# Patient Record
Sex: MALE | Race: BLACK OR AFRICAN AMERICAN | NOT HISPANIC OR LATINO | Employment: OTHER | URBAN - METROPOLITAN AREA
[De-identification: names, ages, dates, MRNs, and addresses within clinical notes are randomized per-mention and may not be internally consistent; named-entity substitution may affect disease eponyms.]

---

## 2017-01-28 ENCOUNTER — APPOINTMENT (OUTPATIENT)
Dept: CT IMAGING | Facility: HOSPITAL | Age: 77
End: 2017-01-28

## 2017-01-28 ENCOUNTER — APPOINTMENT (OUTPATIENT)
Dept: GENERAL RADIOLOGY | Facility: HOSPITAL | Age: 77
End: 2017-01-28

## 2017-01-28 ENCOUNTER — HOSPITAL ENCOUNTER (INPATIENT)
Facility: HOSPITAL | Age: 77
LOS: 5 days | Discharge: HOME | End: 2017-02-02
Attending: EMERGENCY MEDICINE | Admitting: FAMILY MEDICINE

## 2017-01-28 DIAGNOSIS — Z74.09 IMPAIRED FUNCTIONAL MOBILITY, BALANCE, GAIT, AND ENDURANCE: ICD-10-CM

## 2017-01-28 DIAGNOSIS — D64.89 ANEMIA DUE TO OTHER CAUSE: ICD-10-CM

## 2017-01-28 DIAGNOSIS — N28.9 RENAL INSUFFICIENCY: Primary | ICD-10-CM

## 2017-01-28 PROBLEM — E11.9 T2DM (TYPE 2 DIABETES MELLITUS) (HCC): Status: ACTIVE | Noted: 2017-01-28

## 2017-01-28 PROBLEM — R62.51 FAILURE TO THRIVE (0-17): Status: ACTIVE | Noted: 2017-01-28

## 2017-01-28 PROBLEM — E87.5 HYPERKALEMIA: Status: ACTIVE | Noted: 2017-01-28

## 2017-01-28 PROBLEM — I10 ESSENTIAL HYPERTENSION: Status: ACTIVE | Noted: 2017-01-28

## 2017-01-28 PROBLEM — N17.9 ACUTE KIDNEY INJURY (HCC): Status: ACTIVE | Noted: 2017-01-28

## 2017-01-28 PROBLEM — R11.2 NAUSEA & VOMITING: Status: ACTIVE | Noted: 2017-01-28

## 2017-01-28 PROBLEM — D72.829 LEUKOCYTOSIS: Status: ACTIVE | Noted: 2017-01-28

## 2017-01-28 PROBLEM — D75.839 THROMBOCYTOSIS: Status: ACTIVE | Noted: 2017-01-28

## 2017-01-28 PROBLEM — D64.9 ANEMIA: Status: ACTIVE | Noted: 2017-01-28

## 2017-01-28 LAB
ALBUMIN SERPL-MCNC: 3.8 G/DL (ref 3.2–4.8)
ALBUMIN/GLOB SERPL: 1 G/DL (ref 1.5–2.5)
ALP SERPL-CCNC: 88 U/L (ref 25–100)
ALT SERPL W P-5'-P-CCNC: 19 U/L (ref 7–40)
ANION GAP SERPL CALCULATED.3IONS-SCNC: 8 MMOL/L (ref 3–11)
AST SERPL-CCNC: 19 U/L (ref 0–33)
BASOPHILS # BLD AUTO: 0.01 10*3/MM3 (ref 0–0.2)
BASOPHILS NFR BLD AUTO: 0.1 % (ref 0–1)
BILIRUB SERPL-MCNC: 0.2 MG/DL (ref 0.3–1.2)
BILIRUB UR QL STRIP: NEGATIVE
BUN BLD-MCNC: 49 MG/DL (ref 9–23)
BUN/CREAT SERPL: 20.4 (ref 7–25)
CALCIUM SPEC-SCNC: 11 MG/DL (ref 8.7–10.4)
CHLORIDE SERPL-SCNC: 99 MMOL/L (ref 99–109)
CLARITY UR: CLEAR
CO2 SERPL-SCNC: 26 MMOL/L (ref 20–31)
COLOR UR: YELLOW
CREAT BLD-MCNC: 2.4 MG/DL (ref 0.6–1.3)
DEPRECATED RDW RBC AUTO: 39.9 FL (ref 37–54)
EOSINOPHIL # BLD AUTO: 0.03 10*3/MM3 (ref 0.1–0.3)
EOSINOPHIL NFR BLD AUTO: 0.3 % (ref 0–3)
ERYTHROCYTE [DISTWIDTH] IN BLOOD BY AUTOMATED COUNT: 13 % (ref 11.3–14.5)
GFR SERPL CREATININE-BSD FRML MDRD: 32 ML/MIN/1.73
GLOBULIN UR ELPH-MCNC: 4 GM/DL
GLUCOSE BLD-MCNC: 272 MG/DL (ref 70–100)
GLUCOSE BLDC GLUCOMTR-MCNC: 131 MG/DL (ref 70–130)
GLUCOSE BLDC GLUCOMTR-MCNC: 211 MG/DL (ref 70–130)
GLUCOSE UR STRIP-MCNC: ABNORMAL MG/DL
HCT VFR BLD AUTO: 29.4 % (ref 38.9–50.9)
HGB BLD-MCNC: 9.8 G/DL (ref 13.1–17.5)
HGB UR QL STRIP.AUTO: NEGATIVE
HOLD SPECIMEN: NORMAL
HOLD SPECIMEN: NORMAL
IMM GRANULOCYTES # BLD: 0.07 10*3/MM3 (ref 0–0.03)
IMM GRANULOCYTES NFR BLD: 0.6 % (ref 0–0.6)
KETONES UR QL STRIP: NEGATIVE
LEUKOCYTE ESTERASE UR QL STRIP.AUTO: NEGATIVE
LIPASE SERPL-CCNC: 77 U/L (ref 6–51)
LYMPHOCYTES # BLD AUTO: 0.82 10*3/MM3 (ref 0.6–4.8)
LYMPHOCYTES NFR BLD AUTO: 7.6 % (ref 24–44)
MCH RBC QN AUTO: 28.3 PG (ref 27–31)
MCHC RBC AUTO-ENTMCNC: 33.3 G/DL (ref 32–36)
MCV RBC AUTO: 85 FL (ref 80–99)
MONOCYTES # BLD AUTO: 0.94 10*3/MM3 (ref 0–1)
MONOCYTES NFR BLD AUTO: 8.7 % (ref 0–12)
NEUTROPHILS # BLD AUTO: 8.95 10*3/MM3 (ref 1.5–8.3)
NEUTROPHILS NFR BLD AUTO: 82.7 % (ref 41–71)
NITRITE UR QL STRIP: NEGATIVE
PH UR STRIP.AUTO: <=5 [PH] (ref 5–8)
PLATELET # BLD AUTO: 544 10*3/MM3 (ref 150–450)
PMV BLD AUTO: 8.9 FL (ref 6–12)
POTASSIUM BLD-SCNC: 5.8 MMOL/L (ref 3.5–5.5)
PROT SERPL-MCNC: 7.8 G/DL (ref 5.7–8.2)
PROT UR QL STRIP: NEGATIVE
RBC # BLD AUTO: 3.46 10*6/MM3 (ref 4.2–5.76)
SODIUM BLD-SCNC: 133 MMOL/L (ref 132–146)
SP GR UR STRIP: 1.02 (ref 1–1.03)
TROPONIN I SERPL-MCNC: 0 NG/ML (ref 0–0.07)
UROBILINOGEN UR QL STRIP: ABNORMAL
WBC NRBC COR # BLD: 10.82 10*3/MM3 (ref 3.5–10.8)
WHOLE BLOOD HOLD SPECIMEN: NORMAL
WHOLE BLOOD HOLD SPECIMEN: NORMAL

## 2017-01-28 PROCEDURE — 71020 HC CHEST PA AND LATERAL: CPT

## 2017-01-28 PROCEDURE — 36415 COLL VENOUS BLD VENIPUNCTURE: CPT

## 2017-01-28 PROCEDURE — 82962 GLUCOSE BLOOD TEST: CPT

## 2017-01-28 PROCEDURE — 80053 COMPREHEN METABOLIC PANEL: CPT | Performed by: EMERGENCY MEDICINE

## 2017-01-28 PROCEDURE — 25010000002 ONDANSETRON PER 1 MG: Performed by: EMERGENCY MEDICINE

## 2017-01-28 PROCEDURE — 81003 URINALYSIS AUTO W/O SCOPE: CPT | Performed by: EMERGENCY MEDICINE

## 2017-01-28 PROCEDURE — 74176 CT ABD & PELVIS W/O CONTRAST: CPT

## 2017-01-28 PROCEDURE — 99285 EMERGENCY DEPT VISIT HI MDM: CPT

## 2017-01-28 PROCEDURE — 99223 1ST HOSP IP/OBS HIGH 75: CPT | Performed by: FAMILY MEDICINE

## 2017-01-28 PROCEDURE — 83690 ASSAY OF LIPASE: CPT | Performed by: EMERGENCY MEDICINE

## 2017-01-28 PROCEDURE — 93005 ELECTROCARDIOGRAM TRACING: CPT | Performed by: EMERGENCY MEDICINE

## 2017-01-28 PROCEDURE — 85025 COMPLETE CBC W/AUTO DIFF WBC: CPT | Performed by: EMERGENCY MEDICINE

## 2017-01-28 PROCEDURE — 25010000002 HEPARIN (PORCINE) PER 1000 UNITS: Performed by: NURSE PRACTITIONER

## 2017-01-28 PROCEDURE — 84484 ASSAY OF TROPONIN QUANT: CPT

## 2017-01-28 PROCEDURE — 63710000001 INSULIN LISPRO (HUMAN) PER 5 UNITS: Performed by: NURSE PRACTITIONER

## 2017-01-28 RX ORDER — LISINOPRIL 10 MG/1
10 TABLET ORAL DAILY
COMMUNITY
End: 2017-02-02 | Stop reason: HOSPADM

## 2017-01-28 RX ORDER — PANTOPRAZOLE SODIUM 40 MG/10ML
40 INJECTION, POWDER, LYOPHILIZED, FOR SOLUTION INTRAVENOUS
Status: DISCONTINUED | OUTPATIENT
Start: 2017-01-29 | End: 2017-02-02 | Stop reason: HOSPADM

## 2017-01-28 RX ORDER — ONDANSETRON 2 MG/ML
4 INJECTION INTRAMUSCULAR; INTRAVENOUS EVERY 6 HOURS PRN
Status: DISCONTINUED | OUTPATIENT
Start: 2017-01-28 | End: 2017-02-02 | Stop reason: HOSPADM

## 2017-01-28 RX ORDER — ACETAMINOPHEN 325 MG/1
650 TABLET ORAL EVERY 4 HOURS PRN
Status: DISCONTINUED | OUTPATIENT
Start: 2017-01-28 | End: 2017-02-02 | Stop reason: HOSPADM

## 2017-01-28 RX ORDER — HEPARIN SODIUM 5000 [USP'U]/ML
5000 INJECTION, SOLUTION INTRAVENOUS; SUBCUTANEOUS EVERY 8 HOURS SCHEDULED
Status: DISCONTINUED | OUTPATIENT
Start: 2017-01-28 | End: 2017-01-28

## 2017-01-28 RX ORDER — HYDRALAZINE HYDROCHLORIDE 20 MG/ML
20 INJECTION INTRAMUSCULAR; INTRAVENOUS EVERY 6 HOURS PRN
Status: DISCONTINUED | OUTPATIENT
Start: 2017-01-28 | End: 2017-02-02 | Stop reason: HOSPADM

## 2017-01-28 RX ORDER — SODIUM CHLORIDE 9 MG/ML
125 INJECTION, SOLUTION INTRAVENOUS CONTINUOUS
Status: DISCONTINUED | OUTPATIENT
Start: 2017-01-28 | End: 2017-01-30

## 2017-01-28 RX ORDER — DEXTROSE MONOHYDRATE 25 G/50ML
25 INJECTION, SOLUTION INTRAVENOUS
Status: DISCONTINUED | OUTPATIENT
Start: 2017-01-28 | End: 2017-02-02 | Stop reason: HOSPADM

## 2017-01-28 RX ORDER — SODIUM CHLORIDE 0.9 % (FLUSH) 0.9 %
1-10 SYRINGE (ML) INJECTION AS NEEDED
Status: DISCONTINUED | OUTPATIENT
Start: 2017-01-28 | End: 2017-02-02 | Stop reason: HOSPADM

## 2017-01-28 RX ORDER — ONDANSETRON 2 MG/ML
4 INJECTION INTRAMUSCULAR; INTRAVENOUS ONCE
Status: COMPLETED | OUTPATIENT
Start: 2017-01-28 | End: 2017-01-28

## 2017-01-28 RX ORDER — HEPARIN SODIUM 5000 [USP'U]/ML
5000 INJECTION, SOLUTION INTRAVENOUS; SUBCUTANEOUS EVERY 12 HOURS SCHEDULED
Status: DISCONTINUED | OUTPATIENT
Start: 2017-01-28 | End: 2017-02-02 | Stop reason: HOSPADM

## 2017-01-28 RX ORDER — PANTOPRAZOLE SODIUM 40 MG/10ML
40 INJECTION, POWDER, LYOPHILIZED, FOR SOLUTION INTRAVENOUS ONCE
Status: COMPLETED | OUTPATIENT
Start: 2017-01-28 | End: 2017-01-28

## 2017-01-28 RX ORDER — FERROUS SULFATE TAB EC 324 MG (65 MG FE EQUIVALENT) 324 (65 FE) MG
324 TABLET DELAYED RESPONSE ORAL 2 TIMES DAILY WITH MEALS
COMMUNITY

## 2017-01-28 RX ORDER — ONDANSETRON 4 MG/1
4 TABLET, FILM COATED ORAL EVERY 6 HOURS PRN
Status: DISCONTINUED | OUTPATIENT
Start: 2017-01-28 | End: 2017-02-02 | Stop reason: HOSPADM

## 2017-01-28 RX ORDER — NICOTINE POLACRILEX 4 MG
15 LOZENGE BUCCAL
Status: DISCONTINUED | OUTPATIENT
Start: 2017-01-28 | End: 2017-02-02 | Stop reason: HOSPADM

## 2017-01-28 RX ORDER — POLYETHYLENE GLYCOL 3350 17 G/17G
17 POWDER, FOR SOLUTION ORAL DAILY PRN
Status: DISCONTINUED | OUTPATIENT
Start: 2017-01-28 | End: 2017-02-02 | Stop reason: HOSPADM

## 2017-01-28 RX ORDER — SODIUM CHLORIDE 0.9 % (FLUSH) 0.9 %
10 SYRINGE (ML) INJECTION AS NEEDED
Status: DISCONTINUED | OUTPATIENT
Start: 2017-01-28 | End: 2017-02-02 | Stop reason: HOSPADM

## 2017-01-28 RX ORDER — DOCUSATE SODIUM 100 MG/1
100 CAPSULE, LIQUID FILLED ORAL 2 TIMES DAILY
Status: DISCONTINUED | OUTPATIENT
Start: 2017-01-28 | End: 2017-02-02 | Stop reason: HOSPADM

## 2017-01-28 RX ORDER — GLIPIZIDE 5 MG/1
5 TABLET ORAL
COMMUNITY

## 2017-01-28 RX ADMIN — HEPARIN SODIUM 5000 UNITS: 5000 INJECTION, SOLUTION INTRAVENOUS; SUBCUTANEOUS at 21:05

## 2017-01-28 RX ADMIN — ONDANSETRON 4 MG: 2 INJECTION INTRAMUSCULAR; INTRAVENOUS at 12:55

## 2017-01-28 RX ADMIN — INSULIN LISPRO 3 UNITS: 100 INJECTION, SOLUTION INTRAVENOUS; SUBCUTANEOUS at 17:44

## 2017-01-28 RX ADMIN — Medication 10 ML: at 12:48

## 2017-01-28 RX ADMIN — Medication 10 ML: at 15:26

## 2017-01-28 RX ADMIN — SODIUM CHLORIDE 100 ML/HR: 9 INJECTION, SOLUTION INTRAVENOUS at 15:25

## 2017-01-28 RX ADMIN — DOCUSATE SODIUM 100 MG: 100 CAPSULE, LIQUID FILLED ORAL at 17:44

## 2017-01-28 RX ADMIN — PANTOPRAZOLE SODIUM 40 MG: 40 INJECTION, POWDER, FOR SOLUTION INTRAVENOUS at 15:26

## 2017-01-28 RX ADMIN — SODIUM CHLORIDE 1000 ML: 9 INJECTION, SOLUTION INTRAVENOUS at 12:55

## 2017-01-29 LAB
ANION GAP SERPL CALCULATED.3IONS-SCNC: 4 MMOL/L (ref 3–11)
ANION GAP SERPL CALCULATED.3IONS-SCNC: 6 MMOL/L (ref 3–11)
BUN BLD-MCNC: 34 MG/DL (ref 9–23)
BUN BLD-MCNC: 38 MG/DL (ref 9–23)
BUN/CREAT SERPL: 17 (ref 7–25)
BUN/CREAT SERPL: 19 (ref 7–25)
CALCIUM SPEC-SCNC: 10.1 MG/DL (ref 8.7–10.4)
CALCIUM SPEC-SCNC: 9.7 MG/DL (ref 8.7–10.4)
CHLORIDE SERPL-SCNC: 108 MMOL/L (ref 99–109)
CHLORIDE SERPL-SCNC: 108 MMOL/L (ref 99–109)
CO2 SERPL-SCNC: 22 MMOL/L (ref 20–31)
CO2 SERPL-SCNC: 24 MMOL/L (ref 20–31)
CREAT BLD-MCNC: 2 MG/DL (ref 0.6–1.3)
CREAT BLD-MCNC: 2 MG/DL (ref 0.6–1.3)
DEPRECATED RDW RBC AUTO: 41.5 FL (ref 37–54)
ERYTHROCYTE [DISTWIDTH] IN BLOOD BY AUTOMATED COUNT: 13.2 % (ref 11.3–14.5)
GFR SERPL CREATININE-BSD FRML MDRD: 40 ML/MIN/1.73
GFR SERPL CREATININE-BSD FRML MDRD: 40 ML/MIN/1.73
GLUCOSE BLD-MCNC: 225 MG/DL (ref 70–100)
GLUCOSE BLD-MCNC: 374 MG/DL (ref 70–100)
GLUCOSE BLDC GLUCOMTR-MCNC: 196 MG/DL (ref 70–130)
GLUCOSE BLDC GLUCOMTR-MCNC: 246 MG/DL (ref 70–130)
GLUCOSE BLDC GLUCOMTR-MCNC: 286 MG/DL (ref 70–130)
GLUCOSE BLDC GLUCOMTR-MCNC: 303 MG/DL (ref 70–130)
HBA1C MFR BLD: 7.6 % (ref 4.8–5.6)
HCT VFR BLD AUTO: 25.7 % (ref 38.9–50.9)
HGB BLD-MCNC: 8.5 G/DL (ref 13.1–17.5)
MCH RBC QN AUTO: 28.2 PG (ref 27–31)
MCHC RBC AUTO-ENTMCNC: 33.1 G/DL (ref 32–36)
MCV RBC AUTO: 85.4 FL (ref 80–99)
PLATELET # BLD AUTO: 478 10*3/MM3 (ref 150–450)
PMV BLD AUTO: 9 FL (ref 6–12)
POTASSIUM BLD-SCNC: 5.4 MMOL/L (ref 3.5–5.5)
POTASSIUM BLD-SCNC: 5.8 MMOL/L (ref 3.5–5.5)
RBC # BLD AUTO: 3.01 10*6/MM3 (ref 4.2–5.76)
SODIUM BLD-SCNC: 136 MMOL/L (ref 132–146)
SODIUM BLD-SCNC: 136 MMOL/L (ref 132–146)
TSH SERPL DL<=0.05 MIU/L-ACNC: 1.54 MIU/ML (ref 0.35–5.35)
WBC NRBC COR # BLD: 9.19 10*3/MM3 (ref 3.5–10.8)

## 2017-01-29 PROCEDURE — 83036 HEMOGLOBIN GLYCOSYLATED A1C: CPT | Performed by: NURSE PRACTITIONER

## 2017-01-29 PROCEDURE — 97110 THERAPEUTIC EXERCISES: CPT

## 2017-01-29 PROCEDURE — 25010000002 FUROSEMIDE PER 20 MG: Performed by: INTERNAL MEDICINE

## 2017-01-29 PROCEDURE — 84443 ASSAY THYROID STIM HORMONE: CPT | Performed by: NURSE PRACTITIONER

## 2017-01-29 PROCEDURE — 85027 COMPLETE CBC AUTOMATED: CPT | Performed by: NURSE PRACTITIONER

## 2017-01-29 PROCEDURE — 97161 PT EVAL LOW COMPLEX 20 MIN: CPT

## 2017-01-29 PROCEDURE — 80048 BASIC METABOLIC PNL TOTAL CA: CPT | Performed by: NURSE PRACTITIONER

## 2017-01-29 PROCEDURE — 25010000002 HEPARIN (PORCINE) PER 1000 UNITS: Performed by: NURSE PRACTITIONER

## 2017-01-29 PROCEDURE — 82962 GLUCOSE BLOOD TEST: CPT

## 2017-01-29 PROCEDURE — 99233 SBSQ HOSP IP/OBS HIGH 50: CPT | Performed by: INTERNAL MEDICINE

## 2017-01-29 PROCEDURE — 80048 BASIC METABOLIC PNL TOTAL CA: CPT | Performed by: INTERNAL MEDICINE

## 2017-01-29 RX ORDER — FUROSEMIDE 10 MG/ML
40 INJECTION INTRAMUSCULAR; INTRAVENOUS ONCE
Status: COMPLETED | OUTPATIENT
Start: 2017-01-29 | End: 2017-01-29

## 2017-01-29 RX ORDER — FUROSEMIDE 10 MG/ML
40 INJECTION INTRAMUSCULAR; INTRAVENOUS ONCE
Status: DISCONTINUED | OUTPATIENT
Start: 2017-01-29 | End: 2017-01-29

## 2017-01-29 RX ADMIN — INSULIN LISPRO 3 UNITS: 100 INJECTION, SOLUTION INTRAVENOUS; SUBCUTANEOUS at 17:17

## 2017-01-29 RX ADMIN — SODIUM CHLORIDE 100 ML/HR: 9 INJECTION, SOLUTION INTRAVENOUS at 03:53

## 2017-01-29 RX ADMIN — PANTOPRAZOLE SODIUM 40 MG: 40 INJECTION, POWDER, FOR SOLUTION INTRAVENOUS at 05:21

## 2017-01-29 RX ADMIN — POLYETHYLENE GLYCOL 3350 17 G: 17 POWDER, FOR SOLUTION ORAL at 09:01

## 2017-01-29 RX ADMIN — INSULIN LISPRO 2 UNITS: 100 INJECTION, SOLUTION INTRAVENOUS; SUBCUTANEOUS at 09:01

## 2017-01-29 RX ADMIN — HEPARIN SODIUM 5000 UNITS: 5000 INJECTION, SOLUTION INTRAVENOUS; SUBCUTANEOUS at 09:00

## 2017-01-29 RX ADMIN — FUROSEMIDE 40 MG: 10 INJECTION, SOLUTION INTRAMUSCULAR; INTRAVENOUS at 09:00

## 2017-01-29 RX ADMIN — INSULIN LISPRO 5 UNITS: 100 INJECTION, SOLUTION INTRAVENOUS; SUBCUTANEOUS at 21:29

## 2017-01-29 RX ADMIN — HEPARIN SODIUM 5000 UNITS: 5000 INJECTION, SOLUTION INTRAVENOUS; SUBCUTANEOUS at 21:28

## 2017-01-29 RX ADMIN — DOCUSATE SODIUM 100 MG: 100 CAPSULE, LIQUID FILLED ORAL at 09:01

## 2017-01-29 RX ADMIN — INSULIN LISPRO 4 UNITS: 100 INJECTION, SOLUTION INTRAVENOUS; SUBCUTANEOUS at 11:31

## 2017-01-30 ENCOUNTER — APPOINTMENT (OUTPATIENT)
Dept: ULTRASOUND IMAGING | Facility: HOSPITAL | Age: 77
End: 2017-01-30

## 2017-01-30 LAB
CREAT UR-MCNC: 20.4 MG/DL
FERRITIN SERPL-MCNC: 533 NG/ML (ref 22–322)
GLUCOSE BLDC GLUCOMTR-MCNC: 217 MG/DL (ref 70–130)
GLUCOSE BLDC GLUCOMTR-MCNC: 248 MG/DL (ref 70–130)
GLUCOSE BLDC GLUCOMTR-MCNC: 259 MG/DL (ref 70–130)
GLUCOSE BLDC GLUCOMTR-MCNC: 336 MG/DL (ref 70–130)
IRON 24H UR-MRATE: 30 MCG/DL (ref 50–175)
IRON 24H UR-MRATE: 30 MCG/DL (ref 50–175)
IRON SATN MFR SERPL: 16 % (ref 20–50)
MAGNESIUM SERPL-MCNC: 1.3 MG/DL (ref 1.3–2.7)
OSMOLALITY UR: 291 MOSM/KG (ref 300–1100)
TIBC SERPL-MCNC: 186 MCG/DL (ref 250–450)
TROPONIN I SERPL-MCNC: <0.006 NG/ML

## 2017-01-30 PROCEDURE — 99233 SBSQ HOSP IP/OBS HIGH 50: CPT | Performed by: FAMILY MEDICINE

## 2017-01-30 PROCEDURE — 82962 GLUCOSE BLOOD TEST: CPT

## 2017-01-30 PROCEDURE — 93010 ELECTROCARDIOGRAM REPORT: CPT | Performed by: INTERNAL MEDICINE

## 2017-01-30 PROCEDURE — 25010000002 MAGNESIUM SULFATE IN D5W 1G/100ML (PREMIX) 10-5 MG/ML-% SOLUTION: Performed by: FAMILY MEDICINE

## 2017-01-30 PROCEDURE — 25010000002 HEPARIN (PORCINE) PER 1000 UNITS: Performed by: NURSE PRACTITIONER

## 2017-01-30 PROCEDURE — 83735 ASSAY OF MAGNESIUM: CPT | Performed by: FAMILY MEDICINE

## 2017-01-30 PROCEDURE — 83550 IRON BINDING TEST: CPT | Performed by: INTERNAL MEDICINE

## 2017-01-30 PROCEDURE — 82570 ASSAY OF URINE CREATININE: CPT | Performed by: FAMILY MEDICINE

## 2017-01-30 PROCEDURE — 76775 US EXAM ABDO BACK WALL LIM: CPT

## 2017-01-30 PROCEDURE — 93005 ELECTROCARDIOGRAM TRACING: CPT | Performed by: FAMILY MEDICINE

## 2017-01-30 PROCEDURE — 83540 ASSAY OF IRON: CPT | Performed by: INTERNAL MEDICINE

## 2017-01-30 PROCEDURE — 82043 UR ALBUMIN QUANTITATIVE: CPT | Performed by: FAMILY MEDICINE

## 2017-01-30 PROCEDURE — 84484 ASSAY OF TROPONIN QUANT: CPT | Performed by: FAMILY MEDICINE

## 2017-01-30 PROCEDURE — 82728 ASSAY OF FERRITIN: CPT | Performed by: INTERNAL MEDICINE

## 2017-01-30 PROCEDURE — 83935 ASSAY OF URINE OSMOLALITY: CPT | Performed by: FAMILY MEDICINE

## 2017-01-30 RX ORDER — TAMSULOSIN HYDROCHLORIDE 0.4 MG/1
0.4 CAPSULE ORAL DAILY
Status: DISCONTINUED | OUTPATIENT
Start: 2017-01-30 | End: 2017-02-02 | Stop reason: HOSPADM

## 2017-01-30 RX ADMIN — PANTOPRAZOLE SODIUM 40 MG: 40 INJECTION, POWDER, FOR SOLUTION INTRAVENOUS at 06:13

## 2017-01-30 RX ADMIN — INSULIN LISPRO 4 UNITS: 100 INJECTION, SOLUTION INTRAVENOUS; SUBCUTANEOUS at 16:55

## 2017-01-30 RX ADMIN — MAGNESIUM SULFATE HEPTAHYDRATE 1 G: 1 INJECTION, SOLUTION INTRAVENOUS at 18:10

## 2017-01-30 RX ADMIN — INSULIN LISPRO 5 UNITS: 100 INJECTION, SOLUTION INTRAVENOUS; SUBCUTANEOUS at 20:56

## 2017-01-30 RX ADMIN — SODIUM CHLORIDE 125 ML/HR: 9 INJECTION, SOLUTION INTRAVENOUS at 15:10

## 2017-01-30 RX ADMIN — MAGNESIUM SULFATE HEPTAHYDRATE 1 G: 1 INJECTION, SOLUTION INTRAVENOUS at 19:31

## 2017-01-30 RX ADMIN — MAGNESIUM SULFATE HEPTAHYDRATE 1 G: 1 INJECTION, SOLUTION INTRAVENOUS at 20:56

## 2017-01-30 RX ADMIN — SODIUM CHLORIDE 125 ML/HR: 9 INJECTION, SOLUTION INTRAVENOUS at 05:35

## 2017-01-30 RX ADMIN — HEPARIN SODIUM 5000 UNITS: 5000 INJECTION, SOLUTION INTRAVENOUS; SUBCUTANEOUS at 20:56

## 2017-01-30 RX ADMIN — TAMSULOSIN HYDROCHLORIDE 0.4 MG: 0.4 CAPSULE ORAL at 17:55

## 2017-01-30 RX ADMIN — HEPARIN SODIUM 5000 UNITS: 5000 INJECTION, SOLUTION INTRAVENOUS; SUBCUTANEOUS at 08:23

## 2017-01-30 RX ADMIN — INSULIN LISPRO 3 UNITS: 100 INJECTION, SOLUTION INTRAVENOUS; SUBCUTANEOUS at 12:39

## 2017-01-30 RX ADMIN — INSULIN LISPRO 3 UNITS: 100 INJECTION, SOLUTION INTRAVENOUS; SUBCUTANEOUS at 08:14

## 2017-01-31 ENCOUNTER — APPOINTMENT (OUTPATIENT)
Dept: GENERAL RADIOLOGY | Facility: HOSPITAL | Age: 77
End: 2017-01-31
Attending: FAMILY MEDICINE

## 2017-01-31 ENCOUNTER — APPOINTMENT (OUTPATIENT)
Dept: CARDIOLOGY | Facility: HOSPITAL | Age: 77
End: 2017-01-31

## 2017-01-31 LAB
ALBUMIN SERPL-MCNC: 3 G/DL (ref 3.2–4.8)
ALBUMIN/GLOB SERPL: 0.9 G/DL (ref 1.5–2.5)
ALP SERPL-CCNC: 72 U/L (ref 25–100)
ALT SERPL W P-5'-P-CCNC: 31 U/L (ref 7–40)
ANION GAP SERPL CALCULATED.3IONS-SCNC: 5 MMOL/L (ref 3–11)
ARTICHOKE IGE QN: 59 MG/DL (ref 0–130)
AST SERPL-CCNC: 31 U/L (ref 0–33)
BASOPHILS # BLD AUTO: 0.02 10*3/MM3 (ref 0–0.2)
BASOPHILS NFR BLD AUTO: 0.3 % (ref 0–1)
BH CV ECHO MEAS - AO ROOT AREA (BSA CORRECTED): 1.8
BH CV ECHO MEAS - AO ROOT AREA: 4.4 CM^2
BH CV ECHO MEAS - AO ROOT DIAM: 2.4 CM
BH CV ECHO MEAS - BSA(HAYCOCK): 1.3 M^2
BH CV ECHO MEAS - BSA: 1.3 M^2
BH CV ECHO MEAS - BZI_BMI: 16.1 KILOGRAMS/M^2
BH CV ECHO MEAS - BZI_METRIC_HEIGHT: 157.5 CM
BH CV ECHO MEAS - BZI_METRIC_WEIGHT: 39.9 KG
BH CV ECHO MEAS - CONTRAST EF (2CH): 65.3 ML/M^2
BH CV ECHO MEAS - CONTRAST EF 4CH: 56.7 ML/M^2
BH CV ECHO MEAS - EDV(CUBED): 66.2 ML
BH CV ECHO MEAS - EDV(MOD-SP2): 101 ML
BH CV ECHO MEAS - EDV(MOD-SP4): 127 ML
BH CV ECHO MEAS - EDV(TEICH): 71.9 ML
BH CV ECHO MEAS - EF(CUBED): 40.5 %
BH CV ECHO MEAS - EF(MOD-SP2): 65.3 %
BH CV ECHO MEAS - EF(MOD-SP4): 56.7 %
BH CV ECHO MEAS - EF(TEICH): 34 %
BH CV ECHO MEAS - ESV(CUBED): 39.4 ML
BH CV ECHO MEAS - ESV(MOD-SP2): 35 ML
BH CV ECHO MEAS - ESV(MOD-SP4): 55 ML
BH CV ECHO MEAS - ESV(TEICH): 47.5 ML
BH CV ECHO MEAS - FS: 15.9 %
BH CV ECHO MEAS - IVS/LVPW: 0.97
BH CV ECHO MEAS - IVSD: 0.9 CM
BH CV ECHO MEAS - LA DIMENSION: 2.9 CM
BH CV ECHO MEAS - LA/AO: 1.2
BH CV ECHO MEAS - LV DIASTOLIC VOL/BSA (35-75): 94.2 ML/M^2
BH CV ECHO MEAS - LV MASS(C)D: 114.6 GRAMS
BH CV ECHO MEAS - LV MASS(C)DI: 85 GRAMS/M^2
BH CV ECHO MEAS - LV SYSTOLIC VOL/BSA (12-30): 40.8 ML/M^2
BH CV ECHO MEAS - LVIDD: 4 CM
BH CV ECHO MEAS - LVIDS: 3.4 CM
BH CV ECHO MEAS - LVLD AP2: 8.3 CM
BH CV ECHO MEAS - LVLD AP4: 8.5 CM
BH CV ECHO MEAS - LVLS AP2: 6.7 CM
BH CV ECHO MEAS - LVLS AP4: 7.1 CM
BH CV ECHO MEAS - LVPWD: 0.93 CM
BH CV ECHO MEAS - MV A MAX VEL: 87.9 CM/SEC
BH CV ECHO MEAS - MV DEC TIME: 0.15 SEC
BH CV ECHO MEAS - MV E MAX VEL: 93.3 CM/SEC
BH CV ECHO MEAS - MV E/A: 1.1
BH CV ECHO MEAS - PA ACC SLOPE: 1135 CM/SEC^2
BH CV ECHO MEAS - PA ACC TIME: 0.11 SEC
BH CV ECHO MEAS - PA PR(ACCEL): 29.9 MMHG
BH CV ECHO MEAS - RV MAX PG: 3.2 MMHG
BH CV ECHO MEAS - RV V1 MAX: 89.8 CM/SEC
BH CV ECHO MEAS - SI(CUBED): 19.9 ML/M^2
BH CV ECHO MEAS - SI(MOD-SP2): 48.9 ML/M^2
BH CV ECHO MEAS - SI(MOD-SP4): 53.4 ML/M^2
BH CV ECHO MEAS - SI(TEICH): 18.1 ML/M^2
BH CV ECHO MEAS - SV(CUBED): 26.9 ML
BH CV ECHO MEAS - SV(MOD-SP2): 66 ML
BH CV ECHO MEAS - SV(MOD-SP4): 72 ML
BH CV ECHO MEAS - SV(TEICH): 24.4 ML
BH CV ECHO MEAS - TAPSE (>1.6): 1.9 CM2
BH CV XLRA - RV BASE: 3.2 CM
BH CV XLRA - RV LENGTH: 5.3 CM
BH CV XLRA - RV MID: 3.3 CM
BH CV XLRA - TDI S': 16 CM/SEC
BILIRUB SERPL-MCNC: 0.2 MG/DL (ref 0.3–1.2)
BUN BLD-MCNC: 18 MG/DL (ref 9–23)
BUN/CREAT SERPL: 12.9 (ref 7–25)
CALCIUM SPEC-SCNC: 9.4 MG/DL (ref 8.7–10.4)
CHLORIDE SERPL-SCNC: 107 MMOL/L (ref 99–109)
CHOLEST SERPL-MCNC: 105 MG/DL (ref 0–200)
CO2 SERPL-SCNC: 22 MMOL/L (ref 20–31)
CREAT BLD-MCNC: 1.4 MG/DL (ref 0.6–1.3)
DEPRECATED RDW RBC AUTO: 41.1 FL (ref 37–54)
EOSINOPHIL # BLD AUTO: 0.15 10*3/MM3 (ref 0.1–0.3)
EOSINOPHIL NFR BLD AUTO: 2.1 % (ref 0–3)
ERYTHROCYTE [DISTWIDTH] IN BLOOD BY AUTOMATED COUNT: 13.3 % (ref 11.3–14.5)
GFR SERPL CREATININE-BSD FRML MDRD: 60 ML/MIN/1.73
GLOBULIN UR ELPH-MCNC: 3.2 GM/DL
GLUCOSE BLD-MCNC: 311 MG/DL (ref 70–100)
GLUCOSE BLDC GLUCOMTR-MCNC: 172 MG/DL (ref 70–130)
GLUCOSE BLDC GLUCOMTR-MCNC: 202 MG/DL (ref 70–130)
GLUCOSE BLDC GLUCOMTR-MCNC: 292 MG/DL (ref 70–130)
GLUCOSE BLDC GLUCOMTR-MCNC: 308 MG/DL (ref 70–130)
HCT VFR BLD AUTO: 24 % (ref 38.9–50.9)
HDLC SERPL-MCNC: 26 MG/DL (ref 40–60)
HGB BLD-MCNC: 8.2 G/DL (ref 13.1–17.5)
IMM GRANULOCYTES # BLD: 0.05 10*3/MM3 (ref 0–0.03)
IMM GRANULOCYTES NFR BLD: 0.7 % (ref 0–0.6)
LV EF 2D ECHO EST: 55 %
LYMPHOCYTES # BLD AUTO: 0.83 10*3/MM3 (ref 0.6–4.8)
LYMPHOCYTES NFR BLD AUTO: 11.6 % (ref 24–44)
MAGNESIUM SERPL-MCNC: 2 MG/DL (ref 1.3–2.7)
MCH RBC QN AUTO: 29 PG (ref 27–31)
MCHC RBC AUTO-ENTMCNC: 34.2 G/DL (ref 32–36)
MCV RBC AUTO: 84.8 FL (ref 80–99)
MICROALBUMIN UR-MCNC: 6.1 UG/ML
MONOCYTES # BLD AUTO: 0.84 10*3/MM3 (ref 0–1)
MONOCYTES NFR BLD AUTO: 11.7 % (ref 0–12)
NEUTROPHILS # BLD AUTO: 5.28 10*3/MM3 (ref 1.5–8.3)
NEUTROPHILS NFR BLD AUTO: 73.6 % (ref 41–71)
PLATELET # BLD AUTO: 471 10*3/MM3 (ref 150–450)
PMV BLD AUTO: 9 FL (ref 6–12)
POTASSIUM BLD-SCNC: 4.5 MMOL/L (ref 3.5–5.5)
PROT SERPL-MCNC: 6.2 G/DL (ref 5.7–8.2)
RBC # BLD AUTO: 2.83 10*6/MM3 (ref 4.2–5.76)
SODIUM BLD-SCNC: 134 MMOL/L (ref 132–146)
TRIGL SERPL-MCNC: 73 MG/DL (ref 0–150)
TSH SERPL DL<=0.05 MIU/L-ACNC: 1.48 MIU/ML (ref 0.35–5.35)
URATE SERPL-MCNC: 4.4 MG/DL (ref 3.7–9.2)
WBC NRBC COR # BLD: 7.17 10*3/MM3 (ref 3.5–10.8)

## 2017-01-31 PROCEDURE — 80061 LIPID PANEL: CPT | Performed by: PHYSICIAN ASSISTANT

## 2017-01-31 PROCEDURE — 25010000002 HEPARIN (PORCINE) PER 1000 UNITS: Performed by: NURSE PRACTITIONER

## 2017-01-31 PROCEDURE — 84443 ASSAY THYROID STIM HORMONE: CPT | Performed by: FAMILY MEDICINE

## 2017-01-31 PROCEDURE — 99221 1ST HOSP IP/OBS SF/LOW 40: CPT | Performed by: INTERNAL MEDICINE

## 2017-01-31 PROCEDURE — 63710000001 INSULIN DETEMIR PER 5 UNITS: Performed by: FAMILY MEDICINE

## 2017-01-31 PROCEDURE — 99232 SBSQ HOSP IP/OBS MODERATE 35: CPT | Performed by: NURSE PRACTITIONER

## 2017-01-31 PROCEDURE — 80053 COMPREHEN METABOLIC PANEL: CPT | Performed by: FAMILY MEDICINE

## 2017-01-31 PROCEDURE — 93306 TTE W/DOPPLER COMPLETE: CPT | Performed by: INTERNAL MEDICINE

## 2017-01-31 PROCEDURE — 74245: CPT

## 2017-01-31 PROCEDURE — 84550 ASSAY OF BLOOD/URIC ACID: CPT | Performed by: FAMILY MEDICINE

## 2017-01-31 PROCEDURE — 93306 TTE W/DOPPLER COMPLETE: CPT

## 2017-01-31 PROCEDURE — 82962 GLUCOSE BLOOD TEST: CPT

## 2017-01-31 PROCEDURE — 85025 COMPLETE CBC W/AUTO DIFF WBC: CPT | Performed by: INTERNAL MEDICINE

## 2017-01-31 PROCEDURE — C8929 TTE W OR WO FOL WCON,DOPPLER: HCPCS

## 2017-01-31 PROCEDURE — 83735 ASSAY OF MAGNESIUM: CPT | Performed by: FAMILY MEDICINE

## 2017-01-31 RX ADMIN — TAMSULOSIN HYDROCHLORIDE 0.4 MG: 0.4 CAPSULE ORAL at 09:07

## 2017-01-31 RX ADMIN — INSULIN DETEMIR 5 UNITS: 100 INJECTION, SOLUTION SUBCUTANEOUS at 17:14

## 2017-01-31 RX ADMIN — PANTOPRAZOLE SODIUM 40 MG: 40 INJECTION, POWDER, FOR SOLUTION INTRAVENOUS at 06:41

## 2017-01-31 RX ADMIN — INSULIN LISPRO 5 UNITS: 100 INJECTION, SOLUTION INTRAVENOUS; SUBCUTANEOUS at 12:14

## 2017-01-31 RX ADMIN — INSULIN LISPRO 2 UNITS: 100 INJECTION, SOLUTION INTRAVENOUS; SUBCUTANEOUS at 17:04

## 2017-01-31 RX ADMIN — DOCUSATE SODIUM 100 MG: 100 CAPSULE, LIQUID FILLED ORAL at 09:07

## 2017-01-31 RX ADMIN — INSULIN LISPRO 4 UNITS: 100 INJECTION, SOLUTION INTRAVENOUS; SUBCUTANEOUS at 08:16

## 2017-01-31 RX ADMIN — HEPARIN SODIUM 5000 UNITS: 5000 INJECTION, SOLUTION INTRAVENOUS; SUBCUTANEOUS at 20:27

## 2017-01-31 RX ADMIN — HEPARIN SODIUM 5000 UNITS: 5000 INJECTION, SOLUTION INTRAVENOUS; SUBCUTANEOUS at 09:07

## 2017-01-31 RX ADMIN — DOCUSATE SODIUM 100 MG: 100 CAPSULE, LIQUID FILLED ORAL at 17:04

## 2017-01-31 RX ADMIN — INSULIN LISPRO 3 UNITS: 100 INJECTION, SOLUTION INTRAVENOUS; SUBCUTANEOUS at 21:59

## 2017-02-01 ENCOUNTER — APPOINTMENT (OUTPATIENT)
Dept: GENERAL RADIOLOGY | Facility: HOSPITAL | Age: 77
End: 2017-02-01
Attending: FAMILY MEDICINE

## 2017-02-01 LAB
ANION GAP SERPL CALCULATED.3IONS-SCNC: 7 MMOL/L (ref 3–11)
BUN BLD-MCNC: 14 MG/DL (ref 9–23)
BUN/CREAT SERPL: 10.8 (ref 7–25)
CALCIUM SPEC-SCNC: 9.7 MG/DL (ref 8.7–10.4)
CHLORIDE SERPL-SCNC: 106 MMOL/L (ref 99–109)
CO2 SERPL-SCNC: 24 MMOL/L (ref 20–31)
CREAT BLD-MCNC: 1.3 MG/DL (ref 0.6–1.3)
DEPRECATED RDW RBC AUTO: 40.6 FL (ref 37–54)
ERYTHROCYTE [DISTWIDTH] IN BLOOD BY AUTOMATED COUNT: 13.2 % (ref 11.3–14.5)
GFR SERPL CREATININE-BSD FRML MDRD: 65 ML/MIN/1.73
GLUCOSE BLD-MCNC: 162 MG/DL (ref 70–100)
GLUCOSE BLDC GLUCOMTR-MCNC: 174 MG/DL (ref 70–130)
GLUCOSE BLDC GLUCOMTR-MCNC: 198 MG/DL (ref 70–130)
GLUCOSE BLDC GLUCOMTR-MCNC: 257 MG/DL (ref 70–130)
GLUCOSE BLDC GLUCOMTR-MCNC: 302 MG/DL (ref 70–130)
HCT VFR BLD AUTO: 23.7 % (ref 38.9–50.9)
HGB BLD-MCNC: 7.8 G/DL (ref 13.1–17.5)
MCH RBC QN AUTO: 27.9 PG (ref 27–31)
MCHC RBC AUTO-ENTMCNC: 32.9 G/DL (ref 32–36)
MCV RBC AUTO: 84.6 FL (ref 80–99)
PLATELET # BLD AUTO: 462 10*3/MM3 (ref 150–450)
PMV BLD AUTO: 8.7 FL (ref 6–12)
POTASSIUM BLD-SCNC: 4 MMOL/L (ref 3.5–5.5)
RBC # BLD AUTO: 2.8 10*6/MM3 (ref 4.2–5.76)
SODIUM BLD-SCNC: 137 MMOL/L (ref 132–146)
WBC NRBC COR # BLD: 7.06 10*3/MM3 (ref 3.5–10.8)

## 2017-02-01 PROCEDURE — 80048 BASIC METABOLIC PNL TOTAL CA: CPT | Performed by: INTERNAL MEDICINE

## 2017-02-01 PROCEDURE — 85027 COMPLETE CBC AUTOMATED: CPT | Performed by: NURSE PRACTITIONER

## 2017-02-01 PROCEDURE — 63710000001 INSULIN DETEMIR PER 5 UNITS: Performed by: FAMILY MEDICINE

## 2017-02-01 PROCEDURE — 82962 GLUCOSE BLOOD TEST: CPT

## 2017-02-01 PROCEDURE — 99233 SBSQ HOSP IP/OBS HIGH 50: CPT | Performed by: FAMILY MEDICINE

## 2017-02-01 PROCEDURE — 74245: CPT

## 2017-02-01 PROCEDURE — 25010000002 HEPARIN (PORCINE) PER 1000 UNITS: Performed by: NURSE PRACTITIONER

## 2017-02-01 RX ORDER — TAMSULOSIN HYDROCHLORIDE 0.4 MG/1
0.4 CAPSULE ORAL DAILY
Qty: 30 CAPSULE | Refills: 0 | Status: SHIPPED | OUTPATIENT
Start: 2017-02-01

## 2017-02-01 RX ADMIN — HEPARIN SODIUM 5000 UNITS: 5000 INJECTION, SOLUTION INTRAVENOUS; SUBCUTANEOUS at 11:29

## 2017-02-01 RX ADMIN — INSULIN LISPRO 2 UNITS: 100 INJECTION, SOLUTION INTRAVENOUS; SUBCUTANEOUS at 11:29

## 2017-02-01 RX ADMIN — TAMSULOSIN HYDROCHLORIDE 0.4 MG: 0.4 CAPSULE ORAL at 12:48

## 2017-02-01 RX ADMIN — PANTOPRAZOLE SODIUM 40 MG: 40 INJECTION, POWDER, FOR SOLUTION INTRAVENOUS at 05:21

## 2017-02-01 RX ADMIN — DOCUSATE SODIUM 100 MG: 100 CAPSULE, LIQUID FILLED ORAL at 18:02

## 2017-02-01 RX ADMIN — INSULIN LISPRO 5 UNITS: 100 INJECTION, SOLUTION INTRAVENOUS; SUBCUTANEOUS at 18:03

## 2017-02-01 RX ADMIN — DOCUSATE SODIUM 100 MG: 100 CAPSULE, LIQUID FILLED ORAL at 12:48

## 2017-02-01 RX ADMIN — HEPARIN SODIUM 5000 UNITS: 5000 INJECTION, SOLUTION INTRAVENOUS; SUBCUTANEOUS at 21:22

## 2017-02-01 RX ADMIN — INSULIN LISPRO 4 UNITS: 100 INJECTION, SOLUTION INTRAVENOUS; SUBCUTANEOUS at 21:22

## 2017-02-01 RX ADMIN — INSULIN DETEMIR 10 UNITS: 100 INJECTION, SOLUTION SUBCUTANEOUS at 21:23

## 2017-02-01 NOTE — PROGRESS NOTES
"Hospitalist Daily Progress Note    Date of Admission: 1/28/2017   LOS: 4 days   PCP: Oni Jeong MD    Chief Complaint: N/V    Subjective    Pt sitting up in bed had UGI done this am.  Says he feels ok, had BM this am.  Denies nausea today and is eating.  Denies blood in stool that he has noticed.  No fever or chills. He agrees to see GI if he is discharged.  Family/Friend at bedside. Hasn't eaten very much today.  Dizzy with first standing.     Vomiting        Review of Systems   Gastrointestinal: Positive for vomiting.     General: no fevers, chills, + weakness  Respiratory: no cough, dyspnea  Cardiovascular: no chest pain, palpitations  Abdomen: No abdominal pain, nausea, vomiting, or diarrhea  Neurologic: No focal weakness    Objective   Physical Exam:  I have reviewed the vital signs.  Temp:  [97.4 °F (36.3 °C)-98.4 °F (36.9 °C)] 98.4 °F (36.9 °C)  Heart Rate:  [83-90] 86  Resp:  [16-18] 18  BP: (130-158)/(66-81) 142/81    Objective:  Vital signs: (most recent): Blood pressure 142/81, pulse 86, temperature 98.4 °F (36.9 °C), temperature source Oral, resp. rate 18, height 59\" (149.9 cm), weight 80 lb (36.3 kg), SpO2 99 %.            General Appearance:    Alert, cooperative, no distress, thin frail male  Head:    Normocephalic, atraumatic  Eyes:    Sclerae anicteric  Neck:   Supple, no mass  Lungs: Clear to auscultation bilaterally, respirations unlabored  Heart: Regular rate and rhythm, S1 and S2 normal, no murmur, rub or gallop  Abdomen:  Soft, non-tender, bowel sounds active, nondistended  Extremities: No clubbing, cyanosis, or edema to lower extremities. Low muscle mass.  Skin: No rashes   Neurologic: Oriented x3, Normal strength to extremities    Results Review:    I have reviewed the labs, radiology results and diagnostic studies.      Results from last 7 days  Lab Units 02/01/17  0628   WBC 10*3/mm3 7.06   HEMOGLOBIN g/dL 7.8*   PLATELETS 10*3/mm3 462*       Results from last 7 days  Lab Units " 02/01/17  0628   SODIUM mmol/L 137   POTASSIUM mmol/L 4.0   TOTAL CO2 mmol/L 24.0   CREATININE mg/dL 1.30   GLUCOSE mg/dL 162*     Imaging Results (last 24 hours)     Procedure Component Value Units Date/Time    CT Abdomen Pelvis Without Contrast [[id number]] Collected:  01/28/17 1328     Updated:  01/30/17 1322    Narrative:       EXAMINATION: CT ABDOMEN AND PELVIS WO CONTRAST - 01/28/2017      INDICATION: Nausea and vomiting.     TECHNIQUE: 5 mm unenhanced images through the abdomen and pelvis.     The radiation dose reduction device was turned on for each scan per the  ALARA (As Low as Reasonably Achievable) protocol.     COMPARISON: None.     FINDINGS: History indicates vomiting x 3 weeks, back pain, chest pain  and nausea.     The included lower lungs appear grossly clear. Liver appears  unremarkable except for mild fatty change. The spleen is not enlarged.  The gallbladder appears normal. No significant abnormalities are seen of  the pancreas or adrenal glands. The left kidney appears normal. The  right kidney appears unremarkable except for a water density 2 cm upper  pole cyst. No upper abdominal free air, ascites, adenopathy or acute  inflammatory change is seen. The stomach is normally distended and  grossly normal in appearance. Upper abdominal large and small bowel  loops appear grossly normal.     Regarding the lower abdomen and pelvis, the bladder is mildly distended  and normal in appearance. No ascites, adenopathy or acute inflammatory  change is seen. Bowel loops are normal in caliber. Bony structures  appear grossly intact.             Impression:       No evidence of acute inflammatory process, bowel obstruction  or other acute intra-abdominal or intrapelvic disease. Consider  follow-up with oral and IV contrast if patient's symptoms persist or  worsen. Incidentally noted small water density right renal cyst.     DICTATED:     01/28/2017  EDITED:         01/28/2017     This report was finalized on  1/30/2017 1:20 PM by DR. Davie Garner MD.             I have reviewed the medications.    ---------------------------------------------------------------------------------------------  Assessment/Plan   Assessment & Plan  Assessment/Problem List    Principal Problem:      Acute kidney failure, probably prerenal 2/2 dehydration.  --improving  --Nephrology following, plan home off of Lisinopril and off metformin   --renal us reviewed     Nausea & vomiting. Etiology unknown?  --improving  --guiac stool pending  --?diabetic gastroparesis  --check GES that is to be done tomorrow, UGI pending , Will need to see GI ultimatley.     Hyperkalemia, resolved    Hypomagnesium  --replaced, recheck    VTach  --Low mag, replaced  --TSH 1.477  --Cardiology consulted  --ECHO reviewed, EF 55%, mild MR   --consider outpatient cardiac evaluation    T2DM (type 2 diabetes mellitus), not well controlled currently, A1c =7.6.  --continue ssi at this time, patient to be NPO for GES in am.   --pt will likely need insulin in future as his renal function declines but for now will hold on insulin tx, continue glipizide and DC metformin for now at home. Plan to restart when ok with Neprhology     Essential hypertension      Failure to thrive (0-17)  --nutrition to see, Boost Breeze TID   --likely due to chronic nausea    Anemia  --iron def as well as renal insuff  --needs full GI work up as outpt, I have DW pt and family/friend     Thrombocytosis  -- resolved    Leukocytosis  -improved    DVT prophylaxis:teds and scds, hep sq   Discharge Planning: I expect patient to be discharged to home in am .    Eun Sanders,  02/01/17 1:55 PM

## 2017-02-01 NOTE — PROGRESS NOTES
Continued Stay Note   [place]     Patient Name: Leandro Guerrero  MRN: [id number]  Today's Date: 2/1/2017    Admit Date: 1/28/2017          Discharge Plan       02/01/17 1348    Case Management/Social Work Plan    Plan Home    Patient/Family In Agreement With Plan yes    Additional Comments Spoke with patient and significant other at bedside regarding discharge plan.  Patient denies need for Home Health or DME.  Patient will be staying with his significant other after he discharges home.  Patient will discharge home today via car with family/friend to transport.                Discharge Codes     None        Expected Discharge Date and Time     Expected Discharge Date Expected Discharge Time    Feb 1, 2017             Catherine Mota RN

## 2017-02-01 NOTE — PLAN OF CARE
Problem: Patient Care Overview (Adult)  Goal: Plan of Care Review  Outcome: Ongoing (interventions implemented as appropriate)    02/01/17 0457   Coping/Psychosocial Response Interventions   Plan Of Care Reviewed With patient   Patient Care Overview   Progress improving   Outcome Evaluation   Outcome Summary/Follow up Plan pt NPO after midnight for upper GI and possible GI emtying        Goal: Adult Individualization and Mutuality  Outcome: Ongoing (interventions implemented as appropriate)  Goal: Discharge Needs Assessment  Outcome: Ongoing (interventions implemented as appropriate)    Problem: Fluid Volume Deficit (Adult)  Goal: Identify Related Risk Factors and Signs and Symptoms  Outcome: Ongoing (interventions implemented as appropriate)  Goal: Fluid/Electrolyte Balance  Outcome: Ongoing (interventions implemented as appropriate)  Goal: Comfort/Well Being  Outcome: Ongoing (interventions implemented as appropriate)    Problem: Pressure Ulcer Risk (Jeffrey Scale) (Adult,Obstetrics,Pediatric)  Goal: Identify Related Risk Factors and Signs and Symptoms  Outcome: Ongoing (interventions implemented as appropriate)  Goal: Skin Integrity  Outcome: Ongoing (interventions implemented as appropriate)    Problem: Fall Risk (Adult)  Goal: Identify Related Risk Factors and Signs and Symptoms  Outcome: Ongoing (interventions implemented as appropriate)  Goal: Absence of Falls  Outcome: Ongoing (interventions implemented as appropriate)

## 2017-02-01 NOTE — PROGRESS NOTES
"TAMIR Progress Note    1/28/2017        Reason for visit:      Nausea vomiting, jovanni on ckd    Not vomiting no nausea now    ROS:    Pulm:  No SOA  CV:  No CP    I&O:    Intake/Output Summary (Last 24 hours) at 02/01/17 1207  Last data filed at 02/01/17 1100   Gross per 24 hour   Intake    480 ml   Output   2250 ml   Net  -1770 ml       PE:   Blood pressure 142/81, pulse 86, temperature 98.4 °F (36.9 °C), temperature source Oral, resp. rate 18, height 59\" (149.9 cm), weight 80 lb (36.3 kg), SpO2 99 %.    GENERAL: Awake and alert, in no acute distress.   HEENT: PER, No conjunctivitis  NECK: Supple, no JVD     HEART: RRR; No murmur, rubs, gallops.   LUNGS: Clear to auscultation bilaterally without wheezing, rales, rhonchi. Normal respiratory effort. Nonlabored. No dullness.  ABDOMEN: Soft, nontender, nondistended. Positive bowel sounds. No rebound or guarding. NO mass or HSM.  EXT:  No cyanosis, clubbing or edema. No cord.  : Genitalia generally unremarkable.  Without Roa catheter.  SKIN: Warm and dry without cutaneous eruptions on Inspection/palpation.    NEURO: Alert and oriented x 3, Motor 5/5 bilaterally    Medications:    Current Facility-Administered Medications:   •  acetaminophen (TYLENOL) tablet 650 mg, 650 mg, Oral, Q4H PRN, Eun Cunningham, APRN  •  dextrose (D50W) solution 25 g, 25 g, Intravenous, Q15 Min PRN, Eun Cunningham, APRN  •  dextrose (GLUTOSE) oral gel 15 g, 15 g, Oral, Q15 Min PRN, Eun Cunningham, APRN  •  docusate sodium (COLACE) capsule 100 mg, 100 mg, Oral, BID, Eun Cunningham APRN, 100 mg at 01/31/17 1704  •  glucagon (GLUCAGEN) injection 1 mg, 1 mg, Subcutaneous, Q15 Min PRN, Eun Cunningham, APRN  •  heparin (porcine) 5000 UNIT/ML injection 5,000 Units, 5,000 Units, Subcutaneous, Q12H, Eun Cunningham APRN, 5,000 Units at 02/01/17 1129  •  hydrALAZINE (APRESOLINE) injection 20 mg, 20 mg, Intravenous, Q6H PRN, Eun Cunningham, LESLI  •  Influenza Vac Subunit Quad (FLUCELVAX) " injection 0.5 mL, 0.5 mL, Intramuscular, During Hospitalization, Jon Liang MD  •  insulin lispro (humaLOG) injection 2-7 Units, 2-7 Units, Subcutaneous, 4x Daily AC & at Bedtime, LESLI Carrion, 2 Units at 02/01/17 1129  •  magnesium sulfate in D5W 1g/100mL (PREMIX) IVPB 1 g, 1 g, Intravenous, PRN, Last Rate: 100 mL/hr at 01/30/17 2056, 1 g at 01/30/17 2056 **OR** magnesium sulfate in D5W 1g/100mL (PREMIX) IVPB 1 g, 1 g, Intravenous, PRN, Last Rate: 100 mL/hr at 01/30/17 1810, 1 g at 01/30/17 1810 **OR** magnesium sulfate in D5W 1g/100mL (PREMIX) IVPB 1 g, 1 g, Intravenous, PRN, Eun L DO Tommy  •  ondansetron (ZOFRAN) tablet 4 mg, 4 mg, Oral, Q6H PRN **OR** ondansetron (ZOFRAN) injection 4 mg, 4 mg, Intravenous, Q6H PRN, LESLI Carrion  •  pantoprazole (PROTONIX) injection 40 mg, 40 mg, Intravenous, Q AM, Eun Cunningham APRN, 40 mg at 02/01/17 0521  •  pneumococcal polysaccharide 23-valent (PNEUMOVAX-23) vaccine 0.5 mL, 0.5 mL, Intramuscular, During Hospitalization, Jon Liang MD  •  polyethylene glycol (MIRALAX) powder 17 g, 17 g, Oral, Daily PRN, Eun Cunningham APRN, 17 g at 01/29/17 0901  •  sodium chloride 0.9 % flush 1-10 mL, 1-10 mL, Intravenous, PRN, LESLI Carrion  •  Insert peripheral IV, , , Once **AND** sodium chloride 0.9 % flush 10 mL, 10 mL, Intravenous, PRN, Leanne Ann MD, 10 mL at 01/28/17 1526  •  tamsulosin (FLOMAX) 24 hr capsule 0.4 mg, 0.4 mg, Oral, Daily, Eun L Atkins, , 0.4 mg at 01/31/17 0907    Meds reviewed and doses adjusted for renal function    Labs:    Results from last 7 days  Lab Units 02/01/17  0628 01/31/17  0457 01/29/17  0355   WBC 10*3/mm3 7.06 7.17 9.19   HEMOGLOBIN g/dL 7.8* 8.2* 8.5*   HEMATOCRIT % 23.7* 24.0* 25.7*   PLATELETS 10*3/mm3 462* 471* 478*       Results from last 7 days  Lab Units 02/01/17  0628 01/31/17  0457 01/29/17  1141 01/29/17  0355   SODIUM mmol/L 137 134 136 136   POTASSIUM mmol/L 4.0  4.5 5.4 5.8*   CHLORIDE mmol/L 106 107 108 108   TOTAL CO2 mmol/L 24.0 22.0 24.0 22.0   BUN mg/dL 14 18 34* 38*   CREATININE mg/dL 1.30 1.40* 2.00* 2.00*   GLUCOSE mg/dL 162* 311* 374* 225*   CALCIUM mg/dL 9.7 9.4 10.1 9.7       Results from last 7 days  Lab Units 01/31/17  0457   ALK PHOS U/L 72   BILIRUBIN mg/dL 0.2*   ALT (SGPT) U/L 31   AST (SGOT) U/L 31     Invalid input(s): UCOL, UCLR, UPH, USG, UPRO, UBLD, UNITR, ELEU, URBC, UFC, UBACT    Estimated Creatinine Clearance: 25.2 mL/min (by C-G formula based on Cr of 1.3).    Radiology:  Imaging Results (last 72 hours)     Procedure Component Value Units Date/Time    CT Abdomen Pelvis Without Contrast [[id number]] Collected:  01/28/17 1328     Updated:  01/30/17 1322    Narrative:       EXAMINATION: CT ABDOMEN AND PELVIS WO CONTRAST - 01/28/2017      INDICATION: Nausea and vomiting.     TECHNIQUE: 5 mm unenhanced images through the abdomen and pelvis.     The radiation dose reduction device was turned on for each scan per the  ALARA (As Low as Reasonably Achievable) protocol.     COMPARISON: None.     FINDINGS: History indicates vomiting x 3 weeks, back pain, chest pain  and nausea.     The included lower lungs appear grossly clear. Liver appears  unremarkable except for mild fatty change. The spleen is not enlarged.  The gallbladder appears normal. No significant abnormalities are seen of  the pancreas or adrenal glands. The left kidney appears normal. The  right kidney appears unremarkable except for a water density 2 cm upper  pole cyst. No upper abdominal free air, ascites, adenopathy or acute  inflammatory change is seen. The stomach is normally distended and  grossly normal in appearance. Upper abdominal large and small bowel  loops appear grossly normal.     Regarding the lower abdomen and pelvis, the bladder is mildly distended  and normal in appearance. No ascites, adenopathy or acute inflammatory  change is seen. Bowel loops are normal in caliber. Bony  structures  appear grossly intact.             Impression:       No evidence of acute inflammatory process, bowel obstruction  or other acute intra-abdominal or intrapelvic disease. Consider  follow-up with oral and IV contrast if patient's symptoms persist or  worsen. Incidentally noted small water density right renal cyst.     DICTATED:     01/28/2017  EDITED:         01/28/2017     This report was finalized on 1/30/2017 1:20 PM by DR. Davie Garner MD.       FL Upper GI Single Contrast SBFT [[id number]]      Updated:  01/31/17 0848    XR Chest 2 View [[id number]] Collected:  01/31/17 0913     Updated:  01/31/17 1014    Narrative:       EXAMINATION: XR CHEST 2 VIEWS-01/28/2017:      INDICATION: Nausea/vomiting.      COMPARISON: 12/18/2009.     FINDINGS: There is a normal cardiac silhouette. There are chronic  pulmonary changes. There is no acute inflammatory process, mass or  effusion.           Impression:       Stable chronic pulmonary findings.     D:  01/30/2017  E:  01/31/2017     This report was finalized on 1/31/2017 10:11 AM by Dr. Bernardo Jacques MD.             Renal Imaging:   reviewed      IMPRESSION:  AMAYA- Likley ATN and volume depletion on top of taking lisinopril and metformin     CKD- Possibly from diabetes Mellitus- no gross issue with CT scan, will order US kidneys     ANEMIA- Need further work up     HYPERKALEMIA- improving     WT LOSS-?? Need work up      RECOMMENDATIONS:  Renal function much better  Ok from me for discharge- no lisinopril yet for him  Fu as outpatient     Anshul Araujo MD  2/1/2017  12:07 PM

## 2017-02-02 ENCOUNTER — APPOINTMENT (OUTPATIENT)
Dept: NUCLEAR MEDICINE | Facility: HOSPITAL | Age: 77
End: 2017-02-02

## 2017-02-02 VITALS
OXYGEN SATURATION: 97 % | BODY MASS INDEX: 15.71 KG/M2 | RESPIRATION RATE: 18 BRPM | SYSTOLIC BLOOD PRESSURE: 154 MMHG | WEIGHT: 80 LBS | TEMPERATURE: 98.3 F | HEART RATE: 104 BPM | DIASTOLIC BLOOD PRESSURE: 80 MMHG | HEIGHT: 60 IN

## 2017-02-02 PROBLEM — D75.839 THROMBOCYTOSIS: Status: RESOLVED | Noted: 2017-01-28 | Resolved: 2017-02-02

## 2017-02-02 PROBLEM — E87.5 HYPERKALEMIA: Status: RESOLVED | Noted: 2017-01-28 | Resolved: 2017-02-02

## 2017-02-02 PROBLEM — D72.829 LEUKOCYTOSIS: Status: RESOLVED | Noted: 2017-01-28 | Resolved: 2017-02-02

## 2017-02-02 PROBLEM — R11.2 NAUSEA & VOMITING: Status: RESOLVED | Noted: 2017-01-28 | Resolved: 2017-02-02

## 2017-02-02 LAB
DEPRECATED RDW RBC AUTO: 41.9 FL (ref 37–54)
ERYTHROCYTE [DISTWIDTH] IN BLOOD BY AUTOMATED COUNT: 13.3 % (ref 11.3–14.5)
GASTROCULT GAST QL: NEGATIVE
GLUCOSE BLDC GLUCOMTR-MCNC: 100 MG/DL (ref 70–130)
GLUCOSE BLDC GLUCOMTR-MCNC: 167 MG/DL (ref 70–130)
HCT VFR BLD AUTO: 24.9 % (ref 38.9–50.9)
HGB BLD-MCNC: 8.2 G/DL (ref 13.1–17.5)
MCH RBC QN AUTO: 28.1 PG (ref 27–31)
MCHC RBC AUTO-ENTMCNC: 32.9 G/DL (ref 32–36)
MCV RBC AUTO: 85.3 FL (ref 80–99)
PLATELET # BLD AUTO: 375 10*3/MM3 (ref 150–450)
PMV BLD AUTO: 8 FL (ref 6–12)
RBC # BLD AUTO: 2.92 10*6/MM3 (ref 4.2–5.76)
WBC NRBC COR # BLD: 7.51 10*3/MM3 (ref 3.5–10.8)

## 2017-02-02 PROCEDURE — 82962 GLUCOSE BLOOD TEST: CPT

## 2017-02-02 PROCEDURE — 25010000002 HEPARIN (PORCINE) PER 1000 UNITS: Performed by: NURSE PRACTITIONER

## 2017-02-02 PROCEDURE — 85027 COMPLETE CBC AUTOMATED: CPT | Performed by: NURSE PRACTITIONER

## 2017-02-02 PROCEDURE — G0108 DIAB MANAGE TRN  PER INDIV: HCPCS | Performed by: REGISTERED NURSE

## 2017-02-02 PROCEDURE — 99239 HOSP IP/OBS DSCHRG MGMT >30: CPT | Performed by: NURSE PRACTITIONER

## 2017-02-02 PROCEDURE — 0 TECHNETIUM SULFUR COLLOID: Performed by: FAMILY MEDICINE

## 2017-02-02 PROCEDURE — 78264 GASTRIC EMPTYING IMG STUDY: CPT

## 2017-02-02 PROCEDURE — A9541 TC99M SULFUR COLLOID: HCPCS | Performed by: FAMILY MEDICINE

## 2017-02-02 PROCEDURE — 25010000002 NA FERRIC GLUC CPLX PER 12.5 MG: Performed by: INTERNAL MEDICINE

## 2017-02-02 PROCEDURE — 82270 OCCULT BLOOD FECES: CPT | Performed by: FAMILY MEDICINE

## 2017-02-02 RX ADMIN — Medication 1 DOSE: at 09:55

## 2017-02-02 RX ADMIN — INSULIN LISPRO 2 UNITS: 100 INJECTION, SOLUTION INTRAVENOUS; SUBCUTANEOUS at 13:09

## 2017-02-02 RX ADMIN — TAMSULOSIN HYDROCHLORIDE 0.4 MG: 0.4 CAPSULE ORAL at 13:04

## 2017-02-02 RX ADMIN — HEPARIN SODIUM 5000 UNITS: 5000 INJECTION, SOLUTION INTRAVENOUS; SUBCUTANEOUS at 13:06

## 2017-02-02 RX ADMIN — SODIUM CHLORIDE 125 MG: 9 INJECTION, SOLUTION INTRAVENOUS at 13:04

## 2017-02-02 RX ADMIN — PANTOPRAZOLE SODIUM 40 MG: 40 INJECTION, POWDER, FOR SOLUTION INTRAVENOUS at 05:27

## 2017-02-02 RX ADMIN — DOCUSATE SODIUM 100 MG: 100 CAPSULE, LIQUID FILLED ORAL at 13:05

## 2017-02-02 NOTE — PROGRESS NOTES
"T[facility] Progress Note    1/28/2017        Reason for visit:      Nausea vomiting, jovanni on ckd    Not vomiting no nausea now    ROS:    Pulm:  No SOA  CV:  No CP    I&O:    Intake/Output Summary (Last 24 hours) at 02/02/17 1154  Last data filed at 02/02/17 0930   Gross per 24 hour   Intake    480 ml   Output   2150 ml   Net  -1670 ml       PE:   Blood pressure 126/63, pulse 80, temperature 99.2 °F (37.3 °C), temperature source Oral, resp. rate 16, height 59\" (149.9 cm), weight 80 lb (36.3 kg), SpO2 97 %.    GENERAL: Awake and alert, in no acute distress.   HEENT: PER, No conjunctivitis  NECK: Supple, no JVD     HEART: RRR; No murmur, rubs, gallops.   LUNGS: Clear to auscultation bilaterally without wheezing, rales, rhonchi. Normal respiratory effort. Nonlabored. No dullness.  ABDOMEN: Soft, nontender, nondistended. Positive bowel sounds. No rebound or guarding. NO mass or HSM.  EXT:  No cyanosis, clubbing or edema. No cord.  : Genitalia generally unremarkable.  Without Roa catheter.  SKIN: Warm and dry without cutaneous eruptions on Inspection/palpation.    NEURO: Alert and oriented x 3, Motor 5/5 bilaterally    Medications:    Current Facility-Administered Medications:   •  acetaminophen (TYLENOL) tablet 650 mg, 650 mg, Oral, Q4H PRN, Eun Cunningham, APRN  •  dextrose (D50W) solution 25 g, 25 g, Intravenous, Q15 Min PRN, Eun Cunningham, APRN  •  dextrose (GLUTOSE) oral gel 15 g, 15 g, Oral, Q15 Min PRN, Eun Cunningham, APRN  •  docusate sodium (COLACE) capsule 100 mg, 100 mg, Oral, BID, Eun Cunningham APRN, 100 mg at 02/01/17 1802  •  glucagon (GLUCAGEN) injection 1 mg, 1 mg, Subcutaneous, Q15 Min PRN, Eun Cunningham, APRN  •  heparin (porcine) 5000 UNIT/ML injection 5,000 Units, 5,000 Units, Subcutaneous, Q12H, Eun Cunningham APRN, 5,000 Units at 02/01/17 2122  •  hydrALAZINE (APRESOLINE) injection 20 mg, 20 mg, Intravenous, Q6H PRN, Eun Cunningham, LESLI  •  Influenza Vac Subunit Quad (FLUCELVAX) " injection 0.5 mL, 0.5 mL, Intramuscular, During Hospitalization, Jon Liang MD  •  insulin detemir (LEVEMIR) injection 10 Units, 10 Units, Subcutaneous, Nightly, Eun Sanders DO, 10 Units at 02/01/17 2123  •  insulin lispro (humaLOG) injection 2-7 Units, 2-7 Units, Subcutaneous, 4x Daily AC & at Bedtime, LESLI Carrion, 4 Units at 02/01/17 2122  •  magnesium sulfate in D5W 1g/100mL (PREMIX) IVPB 1 g, 1 g, Intravenous, PRN, Last Rate: 100 mL/hr at 01/30/17 2056, 1 g at 01/30/17 2056 **OR** magnesium sulfate in D5W 1g/100mL (PREMIX) IVPB 1 g, 1 g, Intravenous, PRN, Last Rate: 100 mL/hr at 01/30/17 1810, 1 g at 01/30/17 1810 **OR** magnesium sulfate in D5W 1g/100mL (PREMIX) IVPB 1 g, 1 g, Intravenous, PRN, Eun Sanders DO  •  ondansetron (ZOFRAN) tablet 4 mg, 4 mg, Oral, Q6H PRN **OR** ondansetron (ZOFRAN) injection 4 mg, 4 mg, Intravenous, Q6H PRN, LESLI Carrion  •  pantoprazole (PROTONIX) injection 40 mg, 40 mg, Intravenous, Q AM, Eun Cunningham APRN, 40 mg at 02/02/17 0527  •  pneumococcal polysaccharide 23-valent (PNEUMOVAX-23) vaccine 0.5 mL, 0.5 mL, Intramuscular, During Hospitalization, Jon Liang MD  •  polyethylene glycol (MIRALAX) powder 17 g, 17 g, Oral, Daily PRN, Eun Cunningham APRN, 17 g at 01/29/17 0901  •  sodium chloride 0.9 % flush 1-10 mL, 1-10 mL, Intravenous, PRN, LESLI Carrion  •  Insert peripheral IV, , , Once **AND** sodium chloride 0.9 % flush 10 mL, 10 mL, Intravenous, PRN, Leanne Ann MD, 10 mL at 01/28/17 1526  •  tamsulosin (FLOMAX) 24 hr capsule 0.4 mg, 0.4 mg, Oral, Daily, Eun Sanders DO, 0.4 mg at 02/01/17 1248    Meds reviewed and doses adjusted for renal function    Labs:    Results from last 7 days  Lab Units 02/01/17  0628 01/31/17  0457 01/29/17  0355   WBC 10*3/mm3 7.06 7.17 9.19   HEMOGLOBIN g/dL 7.8* 8.2* 8.5*   HEMATOCRIT % 23.7* 24.0* 25.7*   PLATELETS 10*3/mm3 462* 471* 478*       Results from last 7  days  Lab Units 02/01/17  0628 01/31/17  0457 01/29/17  1141 01/29/17  0355   SODIUM mmol/L 137 134 136 136   POTASSIUM mmol/L 4.0 4.5 5.4 5.8*   CHLORIDE mmol/L 106 107 108 108   TOTAL CO2 mmol/L 24.0 22.0 24.0 22.0   BUN mg/dL 14 18 34* 38*   CREATININE mg/dL 1.30 1.40* 2.00* 2.00*   GLUCOSE mg/dL 162* 311* 374* 225*   CALCIUM mg/dL 9.7 9.4 10.1 9.7       Results from last 7 days  Lab Units 01/31/17  0457   ALK PHOS U/L 72   BILIRUBIN mg/dL 0.2*   ALT (SGPT) U/L 31   AST (SGOT) U/L 31     Invalid input(s): UCOL, UCLR, UPH, USG, UPRO, UBLD, UNITR, ELEU, URBC, UFC, UBACT    Estimated Creatinine Clearance: 25.2 mL/min (by C-G formula based on Cr of 1.3).    Radiology:  Imaging Results (last 72 hours)     Procedure Component Value Units Date/Time    CT Abdomen Pelvis Without Contrast [[id number]] Collected:  01/28/17 1328     Updated:  01/30/17 1322    Narrative:       EXAMINATION: CT ABDOMEN AND PELVIS WO CONTRAST - 01/28/2017      INDICATION: Nausea and vomiting.     TECHNIQUE: 5 mm unenhanced images through the abdomen and pelvis.     The radiation dose reduction device was turned on for each scan per the  ALARA (As Low as Reasonably Achievable) protocol.     COMPARISON: None.     FINDINGS: History indicates vomiting x 3 weeks, back pain, chest pain  and nausea.     The included lower lungs appear grossly clear. Liver appears  unremarkable except for mild fatty change. The spleen is not enlarged.  The gallbladder appears normal. No significant abnormalities are seen of  the pancreas or adrenal glands. The left kidney appears normal. The  right kidney appears unremarkable except for a water density 2 cm upper  pole cyst. No upper abdominal free air, ascites, adenopathy or acute  inflammatory change is seen. The stomach is normally distended and  grossly normal in appearance. Upper abdominal large and small bowel  loops appear grossly normal.     Regarding the lower abdomen and pelvis, the bladder is mildly  distended  and normal in appearance. No ascites, adenopathy or acute inflammatory  change is seen. Bowel loops are normal in caliber. Bony structures  appear grossly intact.             Impression:       No evidence of acute inflammatory process, bowel obstruction  or other acute intra-abdominal or intrapelvic disease. Consider  follow-up with oral and IV contrast if patient's symptoms persist or  worsen. Incidentally noted small water density right renal cyst.     DICTATED:     01/28/2017  EDITED:         01/28/2017     This report was finalized on 1/30/2017 1:20 PM by DR. Davie Garner MD.       FL Upper GI Single Contrast SBFT [[id number]]      Updated:  01/31/17 0848    XR Chest 2 View [[id number]] Collected:  01/31/17 0913     Updated:  01/31/17 1014    Narrative:       EXAMINATION: XR CHEST 2 VIEWS-01/28/2017:      INDICATION: Nausea/vomiting.      COMPARISON: 12/18/2009.     FINDINGS: There is a normal cardiac silhouette. There are chronic  pulmonary changes. There is no acute inflammatory process, mass or  effusion.           Impression:       Stable chronic pulmonary findings.     D:  01/30/2017  E:  01/31/2017     This report was finalized on 1/31/2017 10:11 AM by Dr. Bernardo Jacques MD.             Renal Imaging:   reviewed      IMPRESSION:  AMAYA- Likley ATN and volume depletion on top of taking lisinopril and metformin     CKD- Possibly from diabetes Mellitus- no gross issue with CT scan, will order US kidneys     ANEMIA- Need further work up- Tranfuse as needed     HYPERKALEMIA- improving     WT LOSS-?? Need work up      RECOMMENDATIONS:  Renal function much better  Low iron- start ferrlecit give 1 gm total- ?? GI loss  Ok from me for discharge-   Fu as outpatient     Anshul Araujo MD  2/2/2017  11:54 AM

## 2017-02-02 NOTE — PLAN OF CARE
Problem: Patient Care Overview (Adult)  Goal: Plan of Care Review  Outcome: Ongoing (interventions implemented as appropriate)    02/02/17 0431   Coping/Psychosocial Response Interventions   Plan Of Care Reviewed With patient   Patient Care Overview   Progress improving   Outcome Evaluation   Outcome Summary/Follow up Plan Pt rested well throughout the night with no complaints       Goal: Adult Individualization and Mutuality  Outcome: Ongoing (interventions implemented as appropriate)  Goal: Discharge Needs Assessment  Outcome: Ongoing (interventions implemented as appropriate)    Problem: Fluid Volume Deficit (Adult)  Goal: Identify Related Risk Factors and Signs and Symptoms  Outcome: Ongoing (interventions implemented as appropriate)  Goal: Fluid/Electrolyte Balance  Outcome: Ongoing (interventions implemented as appropriate)  Goal: Comfort/Well Being  Outcome: Ongoing (interventions implemented as appropriate)    Problem: Pressure Ulcer Risk (Jeffrey Scale) (Adult,Obstetrics,Pediatric)  Goal: Identify Related Risk Factors and Signs and Symptoms  Outcome: Ongoing (interventions implemented as appropriate)  Goal: Skin Integrity  Outcome: Ongoing (interventions implemented as appropriate)    Problem: Fall Risk (Adult)  Goal: Identify Related Risk Factors and Signs and Symptoms  Outcome: Ongoing (interventions implemented as appropriate)  Goal: Absence of Falls  Outcome: Ongoing (interventions implemented as appropriate)

## 2017-02-02 NOTE — DISCHARGE SUMMARY
[facility] Medicine Services  DISCHARGE SUMMARY       Date of Admission: 1/28/2017  Date of Discharge:  2/2/2017  Primary Care Physician: Oni Jeong MD    Discharge Diagnoses:  Active Hospital Problems (** Indicates Principal Problem)    Diagnosis Date Noted   • **Acute kidney failure [N17.9] 01/28/2017   • T2DM (type 2 diabetes mellitus) [E11.9] 01/28/2017   • Essential hypertension [I10] 01/28/2017   • Failure to thrive (0-17) [R62.51] 01/28/2017   • Anemia [D64.9] 01/28/2017      Resolved Hospital Problems    Diagnosis Date Noted Date Resolved   • Nausea & vomiting [R11.2] 01/28/2017 02/02/2017   • Hyperkalemia [E87.5] 01/28/2017 02/02/2017   • Thrombocytosis [D47.3] 01/28/2017 02/02/2017   • Leukocytosis [D72.829] 01/28/2017 02/02/2017       Presenting Problem/History of Present Illness  Acute kidney injury [N17.9]  Renal insufficiency [N28.9]     Chief Complaint on Day of Discharge: vomiting/weakness    History of Present Illness on Day of Discharge:   Patient is sitting up in bed and states he is ready to go home.  No new issues or complaints.  Had gastric emptying study today.  Family is at bedside.  No nausea or vomiting.  Hospital Course  Patient is a 76 y.o. male with past medical history of essential hypertension, type 2 diabetes, iron deficient anemia who lives alone and has caregiver who helps out around the house.  He presented to [facility] ED with complaints of 3 week history of vomiting.  Patient reports not vomiting every day but unable to tolerate certain foods or keep them down.  Patient denied any fevers, chills, diarrhea or abdominal pain.  He has been seen by his primary care provider twice for intermittent vomiting over the last 3 weeks.  Evaluation in the ED revealed patient in acute renal failure, no reported history of kidney disease.  He was also noted to have hyperkalemia, thrombocytosis, and leukocytosis.  Chest x-ray and CT of abdomen  and pelvis were negative.  Patient was admitted to the hospital medicine service for further evaluation and management.  Nephrology was consulted for acute kidney injury and likely ATN and volume depletion on top of taking lisinopril and metformin.  Renal ultrasound was unremarkable.  Patient had anemia workup, see lab results below. Patient taking iron supplements and will continue at discharge.  Hemoglobin is stable.  Patient with no acute signs or symptoms of bleeding noted.  He needs to follow-up with GI for a full workup as outpatient.  Renal function has improved and patient denies nausea or vomiting.  We'll continue to hold metformin and lisinopril at discharge.  Cardiology was consulted for narrow complex tachycardia.  Patient had hypomagnesemia at the time of event, which was replaced.  He is asymptomatic.  Echo showed normal left ventricular function with EF of 55% and mild mitral valve regurgitation.  Nausea and vomiting with unknown etiology has resolved at this time.  Patient had an upper GI completed that showed moderate GERD to the level of the mid-esophagus and small sized sliding-type hernia.  Patient had gastric emptying study on day of discharge and final report is pending.  He will neeed to follow up with PCP to get results.  He will need to follow-up with GI for further workup is needed.  Patient with type 2 diabetes with A1c 7.6.  He will likely need insulin in the future as his renal function declines but for now we'll hold on insulin treatment and continue glipizide and DC metformin for now at home. Plan to restart when okay with nephrology.  Patient prefers to continue PO meds and hold off on starting insulin until he has no other options.  Patient to follow up with PCP within 1 week to re-evaluate.  Patient is medically stable and ready for discharge home today.  Discharge plans and instructions were reviewed with patient and he verbalized understanding.    Procedures Performed:  None        Consults:   Consults     Date and Time Order Name Status Description    1/30/2017 1646 Inpatient Consult to Cardiology Completed     1/30/2017 1002 Inpatient Consult to Nephrology          Pertinent Test Results:    Results from last 7 days  Lab Units 02/02/17  1246 02/01/17  0628 01/31/17  0457   WBC 10*3/mm3 7.51 7.06 7.17   HEMOGLOBIN g/dL 8.2* 7.8* 8.2*   HEMATOCRIT % 24.9* 23.7* 24.0*   PLATELETS 10*3/mm3 375 462* 471*       Results from last 7 days  Lab Units 02/01/17  0628 01/31/17  0457 01/29/17  1141   SODIUM mmol/L 137 134 136   POTASSIUM mmol/L 4.0 4.5 5.4   CHLORIDE mmol/L 106 107 108   TOTAL CO2 mmol/L 24.0 22.0 24.0   BUN mg/dL 14 18 34*   CREATININE mg/dL 1.30 1.40* 2.00*   GLUCOSE mg/dL 162* 311* 374*   CALCIUM mg/dL 9.7 9.4 10.1     Imaging Results (last 72 hours)     Procedure Component Value Units Date/Time    XR Chest 2 View [[id number]] Collected:  01/31/17 0913     Updated:  01/31/17 1014    Narrative:       EXAMINATION: XR CHEST 2 VIEWS-01/28/2017:      INDICATION: Nausea/vomiting.      COMPARISON: 12/18/2009.     FINDINGS: There is a normal cardiac silhouette. There are chronic  pulmonary changes. There is no acute inflammatory process, mass or  effusion.           Impression:       Stable chronic pulmonary findings.     D:  01/30/2017  E:  01/31/2017     This report was finalized on 1/31/2017 10:11 AM by Dr. Bernardo Jacques MD.       FL Upper GI Single Contrast SBFT [[id number]] Collected:  01/31/17 1221     Updated:  01/31/17 1228    Narrative:       EXAMINATION: FL UPPER GI SINGLE CONTRAST SBFT-     INDICATION: nausea vomiting; N28.9-Disorder of kidney and ureter,  unspecified; D64.89-Other specified anemias; Z74.09-Other reduced  mobility         TECHNIQUE: 24 seconds of fluoroscopic time was used for this exam.     COMPARISON: NONE     FINDINGS: Multiple filling defects seen within the stomach likely  represent undigested food from needle ingested medially previous to this  study. We'll  attempt to reschedule upper GI and small bowel  follow-through for tomorrow morning if patient is nothing by mouth after  midnight.          Impression:       Incomplete upper GI and small bowel follow-through series  due to undigested food present within the stomach. Will attempt to  repeat study on 2/1/2017.         This report was finalized on 1/31/2017 12:26 PM by Dr. Geoffrey Wen MD.       FL Upper GI Single Contrast SBFT [[id number]] Collected:  02/01/17 1511     Updated:  02/01/17 1511    Narrative:       EXAMINATION: FL UPPER GI SINGLE CONTRAST SBFT-     INDICATION: nausea, vomiting; N28.9-Disorder of kidney and ureter,  unspecified; D64.89-Other specified anemias; Z74.09-Other reduced  mobility         TECHNIQUE: 1 minute and 45 seconds of fluoroscopic time was used for  this exam. 14 associated images were saved.  imaging reveals a  nonobstructive bowel gas pattern. Flocculated barium is seen within the  ascending, and transverse colon. Concentrated barium is seen within the  descending colon, which is likely remnant from yesterday's attempt at  upper GI series.     COMPARISON: NONE     FINDINGS: Upper GI series: Under fluoroscopic observation, the patient  ingested thin barium. The oral phase of deglutition appeared normal. The  esophageal mucosa appeared grossly normal. There was moderate  gastroesophageal reflux to the level of the midesophagus. There was no  evidence of a focal esophageal stricture. Examination of the stomach  demonstrated a small sized sliding-type hiatal hernia. The gastric folds  and gastric mucosa appeared grossly normal. There was no evidence of a  gastric or duodenal ulcer. There was no delay in gastric emptying. The  duodenal bulb and duodenal C-loop appeared grossly normal.     Small bowel follow-through: A single contrast study using thin barium  was performed. Images of the small bowel were obtained at 15 minutes,  and 45 minutes. 15 minute film shows contrast  "opacification of grossly  normal-appearing stomach, proximal, mid, and distal small bowel.  Contrast is seen reaching the colon at 45 minutes. There was no  dilatation or other evidence of obstruction. There was no fold  thickening, filling defect, or mucosal irregularity. There is normal  peristalsis throughout the small bowel. The terminal ileum was well  defined and spot imaging, and appeared within normal limits.          Impression:       Upper GI series:  1. Moderate gastroesophageal reflux to the level of the midesophagus  2. Small sized sliding-type hernia     Small bowel follow-through: Small bowel series appeared within normal  limits.           NM Gastric Emptying [[id number]]      Updated:  02/02/17 1137        Results for JOSE G HUSTON (MRN [id number]) as of 2/2/2017 13:30   Ref. Range 1/30/2017 12:35   Iron Latest Ref Range: 50 - 175 mcg/dL 30 (L)   Ferritin Latest Ref Range: 22.00 - 322.00 ng/mL 533.00 (H)   Iron Saturation Latest Ref Range: 20 - 50 % 16 (L)   TIBC Latest Ref Range: 250 - 450 mcg/dL 186 (L)     Condition on Discharge:  stable    Physical Exam on Discharge:  Visit Vitals   • /63 (BP Location: Right arm, Patient Position: Lying)   • Pulse 80   • Temp 99.2 °F (37.3 °C) (Oral)   • Resp 16   • Ht 59\" (149.9 cm)   • Wt 80 lb (36.3 kg)   • SpO2 97%   • BMI 16.16 kg/m2     Physical Exam   Constitutional: He is oriented to person, place, and time. He appears well-developed and well-nourished. No distress.   Thin frail elderly male   HENT:   Head: Normocephalic and atraumatic.   Eyes: Conjunctivae are normal.   Neck: Normal range of motion. Neck supple.   Cardiovascular: Normal rate, regular rhythm and normal heart sounds.    No murmur heard.  Pulmonary/Chest: Effort normal and breath sounds normal. No respiratory distress. He has no wheezes.   Abdominal: Soft. Bowel sounds are normal. He exhibits no distension. There is no tenderness.   Musculoskeletal: Normal range of motion. He exhibits " no edema.   Low muscle mass   Neurological: He is alert and oriented to person, place, and time.   Skin: Skin is warm and dry.   Psychiatric: He has a normal mood and affect. His behavior is normal. Judgment and thought content normal.     Discharge Disposition  Home or Self Care    Discharge Medications   Leandro Guerrero   Home Medication Instructions DEVENDRA:[id number]    Printed on:02/02/17 142   Medication Information                      ferrous sulfate 324 (65 FE) MG tablet delayed-release EC tablet  Take 324 mg by mouth 2 (Two) Times a Day With Meals.             glipiZIDE (GLUCOTROL) 5 MG tablet  Take 5 mg by mouth 2 (Two) Times a Day Before Meals.             tamsulosin (FLOMAX) 0.4 MG capsule 24 hr capsule  Take 1 capsule by mouth Daily.                 Discharge Diet:   Diet Instructions     Diet: Regular, Consistent Carbohydrate, Cardiac; Thin Liquids, No Restrictions       Discharge Diet:   Regular  Consistent Carbohydrate  Cardiac      Fluid Consistency:  Thin Liquids, No Restrictions               Activity at Discharge:   Activity Instructions     Activity as Tolerated                   Follow-up Appointments  No future appointments.  Referrals and Follow-ups to Schedule     Follow-Up    As directed    PCP within 1 week for hospital follow up   GI as a new patient appointment       Follow-Up    As directed    Dr. Araujo in 1-2 weeks                 LESLI Bragg 02/02/17 2:21 PM    Time: Discharge 40 min    Please note that portions of this note may have been completed with a voice recognition program. Efforts were made to edit the dictations, but occasionally words are mistranscribed.

## 2017-02-02 NOTE — CONSULTS
"Diabetes Education  Assessment/Teaching    Patient Name:  Leandro Guerrero  YOB: 1940  MRN: [id number]  Admit Date:  1/28/2017      Assessment Date:  2/2/2017       Most Recent Value    General Information      Referral From:  A1c    Height  4' 11\" (1.499 m)    Height Method  Stated    Weight  80 lb (36.3 kg)    Weight Method  Standing scale    Pregnancy Assessment     Diabetes History     What type of diabetes do you have?  Type 2    Length of Diabetes Diagnosis  10 + years    Current DM knowledge  good    Have you had diabetes education/teaching in the past?  yes    When and where was your diabetes education?  cannot recall, exact date    Do you test your blood sugar at home?  yes    Have you had low blood sugar? (<70mg/dl)  -- [unaware of any previous lows]    Have you had high blood sugar? (>140mg/dl)  yes    How often do you have high blood sugar?  occasionally    How would you rate your diabetes control?  good    How often do you check your feet?  weekly    Education Preferences     What areas of diabetes would you like to learn about?  avoiding high blood sugar, avoiding low blood sugar, testing my blood sugar at home    Nutrition Information     Are you currently following a special meal plan?  frequently    Do you eat mostly at home or out of the house?  at home    Assessment Topics     DM Goals                Most Recent Value    DM Education Needs     Meter  Has own    Meter Type  One Touch    Frequency of Testing  2 times a day    Medication  Oral    Problem Solving  Hyperglycemia, Hypoglycemia, Sick days, Symptoms, Signs, Treatment    Reducing Risks  A1C testing, Blood pressure    Physical Activity  None    Physical Activity Frequency  Never    Healthy Coping  Appropriate    Discharge Plan  Home    Motivation  Moderate    Teaching Method  Explanation, Discussion, Handouts, Teach back    Patient Response  Verbalized understanding          Pt education on DM completed. Pt given the [employer] " "health \"what is diabetes\" education handouts. Patient states has all supplies needed to check glucose at home as well as ordered medications Pt advised to contact PCP for prescription for strips and lancets.States doesn't always remember to check glucose often or take medication, but states will work on that and S/O states that she will make sure \"he starts doing, what he is supposed to\" Pt also advised to call us with any other questions or concerns.Thank you for this consult Laura Stubbs RN Diabetes Education          Electronically signed by:  Elda Stubbs RN  02/02/17 2:54 PM  "